# Patient Record
Sex: FEMALE | URBAN - METROPOLITAN AREA
[De-identification: names, ages, dates, MRNs, and addresses within clinical notes are randomized per-mention and may not be internally consistent; named-entity substitution may affect disease eponyms.]

---

## 2018-10-03 ENCOUNTER — APPOINTMENT (RX ONLY)
Dept: URBAN - METROPOLITAN AREA CLINIC 137 | Facility: CLINIC | Age: 33
Setting detail: DERMATOLOGY
End: 2018-10-03

## 2018-10-03 VITALS
WEIGHT: 134 LBS | HEART RATE: 70 BPM | DIASTOLIC BLOOD PRESSURE: 77 MMHG | HEIGHT: 61 IN | RESPIRATION RATE: 16 BRPM | SYSTOLIC BLOOD PRESSURE: 133 MMHG

## 2018-10-03 DIAGNOSIS — L30.1 DYSHIDROSIS [POMPHOLYX]: ICD-10-CM

## 2018-10-03 PROBLEM — L85.3 XEROSIS CUTIS: Status: ACTIVE | Noted: 2018-10-03

## 2018-10-03 PROCEDURE — ? PRESCRIPTION

## 2018-10-03 PROCEDURE — 99202 OFFICE O/P NEW SF 15 MIN: CPT

## 2018-10-03 RX ORDER — HALOBETASOL PROPIONATE 0.5 MG/G
OINTMENT TOPICAL BID
Qty: 1 | Refills: 0 | Status: ERX | COMMUNITY
Start: 2018-10-03

## 2018-10-03 RX ADMIN — HALOBETASOL PROPIONATE: 0.5 OINTMENT TOPICAL at 17:23

## 2018-10-03 NOTE — HPI: RASH
What Type Of Note Output Would You Prefer (Optional)?: Standard Output
How Severe Is Your Rash?: severe
Is This A New Presentation, Or A Follow-Up?: Rash
Additional History: Does not wear sunscreen daily.